# Patient Record
Sex: MALE | Race: OTHER | URBAN - METROPOLITAN AREA
[De-identification: names, ages, dates, MRNs, and addresses within clinical notes are randomized per-mention and may not be internally consistent; named-entity substitution may affect disease eponyms.]

---

## 2017-01-27 ENCOUNTER — EMERGENCY (EMERGENCY)
Facility: HOSPITAL | Age: 47
LOS: 1 days | Discharge: HOME | End: 2017-01-27
Attending: EMERGENCY MEDICINE | Admitting: EMERGENCY MEDICINE
Payer: COMMERCIAL

## 2017-01-27 VITALS
SYSTOLIC BLOOD PRESSURE: 100 MMHG | HEART RATE: 59 BPM | OXYGEN SATURATION: 98 % | RESPIRATION RATE: 17 BRPM | TEMPERATURE: 98 F | DIASTOLIC BLOOD PRESSURE: 66 MMHG

## 2017-01-27 DIAGNOSIS — T15.02XA FOREIGN BODY IN CORNEA, LEFT EYE, INITIAL ENCOUNTER: ICD-10-CM

## 2017-01-27 DIAGNOSIS — F17.210 NICOTINE DEPENDENCE, CIGARETTES, UNCOMPLICATED: ICD-10-CM

## 2017-01-27 DIAGNOSIS — T15.01XA FOREIGN BODY IN CORNEA, RIGHT EYE, INITIAL ENCOUNTER: ICD-10-CM

## 2017-01-27 PROCEDURE — 70486 CT MAXILLOFACIAL W/O DYE: CPT

## 2017-01-27 PROCEDURE — 99284 EMERGENCY DEPT VISIT MOD MDM: CPT

## 2017-01-27 PROCEDURE — 70486 CT MAXILLOFACIAL W/O DYE: CPT | Mod: 26

## 2017-01-27 PROCEDURE — 99284 EMERGENCY DEPT VISIT MOD MDM: CPT | Mod: 25

## 2017-01-27 RX ORDER — OFLOXACIN 0.3 %
1 DROPS OPHTHALMIC (EYE) ONCE
Qty: 0 | Refills: 0 | Status: COMPLETED | OUTPATIENT
Start: 2017-01-27 | End: 2017-01-27

## 2017-01-27 RX ORDER — IBUPROFEN 200 MG
600 TABLET ORAL ONCE
Qty: 0 | Refills: 0 | Status: COMPLETED | OUTPATIENT
Start: 2017-01-27 | End: 2017-01-27

## 2017-01-27 RX ADMIN — Medication 600 MILLIGRAM(S): at 13:27

## 2017-01-27 RX ADMIN — Medication 1 DROP(S): at 13:42

## 2017-01-27 NOTE — ED PROVIDER NOTE - OBJECTIVE STATEMENT
47yo male pt, no PMHx c/o right eye FB during grinding metal yesterday 1 pm. + Seen by optometry yesterday and irrigated the eye/ uptodated TD. Pt c/o eye pain with fb sensation. Denies fever, chills or recent cold symptoms. Denies visual changes or headache. Denies N/V. Denies sensory changes or weakness to extremities.

## 2017-01-27 NOTE — ED PROVIDER NOTE - MEDICAL DECISION MAKING DETAILS
R eye FB c/w rust ring.  Mechanism and pain concerning for intra-occular FB, will eval c CT and s/w ophtho.  Abx.  No s/s of open globe.  Pain control.  Reassess.  --BMM

## 2017-01-27 NOTE — ED ADULT NURSE NOTE - OBJECTIVE STATEMENT
c/o pain to right eye. States he was using a  yesterday when something flew into his eye. He was seen by an optometrist and informed there is a foreign body in his right eye.

## 2017-01-27 NOTE — ED PROVIDER NOTE - [facility]PI ED SYMPTOMS NEG
no purulent drainage/no drainage/no double vision/no photophobia/no itching/no blurred vision/no discharge

## 2017-01-27 NOTE — ED ADULT TRIAGE NOTE - CHIEF COMPLAINT QUOTE
Foreign body to right eye. Patient was working with  yesterday, was hit in eye, was seen at MD office today and sent to ED for evaluation, patient was told that he needs surgery

## 2017-01-30 PROBLEM — Z00.00 ENCOUNTER FOR PREVENTIVE HEALTH EXAMINATION: Status: ACTIVE | Noted: 2017-01-30

## 2017-02-03 ENCOUNTER — APPOINTMENT (OUTPATIENT)
Dept: OPHTHALMOLOGY | Facility: CLINIC | Age: 47
End: 2017-02-03

## 2017-02-09 ENCOUNTER — APPOINTMENT (OUTPATIENT)
Dept: OPHTHALMOLOGY | Facility: CLINIC | Age: 47
End: 2017-02-09

## 2017-02-17 ENCOUNTER — APPOINTMENT (OUTPATIENT)
Dept: OPHTHALMOLOGY | Facility: CLINIC | Age: 47
End: 2017-02-17